# Patient Record
Sex: FEMALE | Race: ASIAN
[De-identification: names, ages, dates, MRNs, and addresses within clinical notes are randomized per-mention and may not be internally consistent; named-entity substitution may affect disease eponyms.]

---

## 2019-11-29 PROBLEM — Z00.00 ENCOUNTER FOR PREVENTIVE HEALTH EXAMINATION: Status: ACTIVE | Noted: 2019-11-29

## 2019-12-10 ENCOUNTER — APPOINTMENT (OUTPATIENT)
Dept: ORTHOPEDIC SURGERY | Facility: CLINIC | Age: 54
End: 2019-12-10
Payer: COMMERCIAL

## 2019-12-10 VITALS — BODY MASS INDEX: 29.02 KG/M2 | WEIGHT: 170 LBS | HEIGHT: 64 IN

## 2019-12-10 VITALS — HEART RATE: 87 BPM | SYSTOLIC BLOOD PRESSURE: 128 MMHG | DIASTOLIC BLOOD PRESSURE: 88 MMHG

## 2019-12-10 DIAGNOSIS — Z78.9 OTHER SPECIFIED HEALTH STATUS: ICD-10-CM

## 2019-12-10 DIAGNOSIS — M79.662 PAIN IN LEFT LOWER LEG: ICD-10-CM

## 2019-12-10 DIAGNOSIS — Z98.890 OTHER SPECIFIED POSTPROCEDURAL STATES: ICD-10-CM

## 2019-12-10 DIAGNOSIS — Z82.49 FAMILY HISTORY OF ISCHEMIC HEART DISEASE AND OTHER DISEASES OF THE CIRCULATORY SYSTEM: ICD-10-CM

## 2019-12-10 DIAGNOSIS — M48.061 SPINAL STENOSIS, LUMBAR REGION WITHOUT NEUROGENIC CLAUDICATION: ICD-10-CM

## 2019-12-10 DIAGNOSIS — Z86.79 PERSONAL HISTORY OF OTHER DISEASES OF THE CIRCULATORY SYSTEM: ICD-10-CM

## 2019-12-10 PROCEDURE — 73590 X-RAY EXAM OF LOWER LEG: CPT | Mod: LT

## 2019-12-10 PROCEDURE — 99204 OFFICE O/P NEW MOD 45 MIN: CPT

## 2019-12-10 NOTE — DISCUSSION/SUMMARY
[de-identified] : Referred pain to left calf likely from lumbar spinal foraminal stenosis\par \par Due to significant improvement in pain with patient over the last few days recommend conservative management including\par \par Physical therapy\par \par The patient was prescribed Diclofenac PO non-steroidal anti-inflammatory medication. 50mg tablets twice daily to be taken for at least 1-2 weeks in a row and then PRN afterwards. Risks and benefits were discussed and include but not limited to renal damage and GI ulceration and bleeding.  They were advised to take with food to limit stomach upset as well as warned to stop the medication if worsening gastric pain or dizziness or other side effects. Also to immediately stop the medication and seek appropriate medical attention if any severe stomach ache, gastritis, black/red vomit, black/red stools or any other medical concern.\par \par \par Followup p.r.n.

## 2019-12-10 NOTE — HISTORY OF PRESENT ILLNESS
[de-identified] : CC LEFT knee\par \par JANES LEBLANC, 54 year old F RHD presents with acute onset of 2 months  of Activity Related pain in the proximal LEFT calf without injury. The pain is better, and rated a 3# out of 10, described as Burning/Sharp, without radiation. Rest makes the pain better and walking/stairs makes the pain worse. The patient reports associated symptoms of weakness.  The patient denies pain at night affecting sleep, and denies similar pain previously.  Patient stated she had an ultrasound study done  of the left leg and denies DVTs/blood clots.\par \par The patient has tried the following treatments:\par Activity modification	+ \par Ice/Compression  	               + \par Braces    		               -\par Nsaids    		               +\par Physical Therapy  	              -\par Cortisone Injection	               -\par Visco Injection		-\par Arthroscopy		-\par \par Review of Systems is positive for the above musculoskeletal symptoms and is otherwise non-contributory for general, constitutional, psychiatric, neurologic, HEENT, cardiac, respiratory, gastrointestinal, reproductive, lymphatic, and dermatologic complaints.\par \par Consult by Dr Lee Amaya\par

## 2019-12-10 NOTE — PHYSICAL EXAM
[de-identified] : \par Physical Examination\par General: well nourished, in no acute distress, alert and oriented to person, place and time\par Psychiatric: normal mood and affect, no abnormal movements or speech patterns\par Eyes: vision intact With glasses\par Throat: no thyromegaly\par Lymph: no enlarged nodes, no lymphedema in extremity\par Respiratory: no wheezing, no shortness of breath with ambulation\par Cardiac: no cardiac leg swelling, 2+ peripheral pulses\par Neurology: normal gross sensation in extremities to light touch\par Abdomen: soft, non-tender, tympanic, no masses\par \par Musculoskeletal Examination\par Ambulation	 +  antalgic gait, +  assistive devices\par \par Knee			Right			Left\par General\par      Swelling/Deformity	normal			normal	\par      Skin			normal			normal\par      Erythema		-			-\par      Standing Alignment	neutral			neutral\par      Effusion		none			none\par Range of Motion\par      Hip			full painless ROM		full painless ROM\par      Knee Flexion		130			130\par      Knee Extension	0			0\par Patella\par      J Sign		-			-\par      Quad Medial/Lateral	1/1 1/1\par      Apprehension		-			-\par      Elliott's		-			-\par      Grind Sign		-			-\par      Crepitus		-			-\par Palpation\par      Medial Joint Line	-			-\par      Medial Fem Condyle	-			-\par      Lateral Joint Line	-			-\par      Quad Tendon		-			-\par      Patella Tendon	-			-\par      Medial Patella		-			-\par      Lateral Patella 	-			-\par      Posterior Knee	-			-\par no tenderness left gastroc\par Ligamentous\par      Varus @ 0° / 30°	-/-			-/-\par      Valgus @ 0° / 30°	-/-			-/-\par      Lachman		-			-\par      Pivot Shift		-			-\par      Anterior Drawer	-			-\par      Posterior Drawer	-			-\par Meniscus\par      Abdi		-			-\par      Flexion Pinch		-			-\par Single Leg Raise                     -                                               -\par Strength Examination/Atrophy\par      Hip Flexors 		5+			5+\par      Quadriceps		5+			5+\par      Hamstring		5+			5+\par      Tibialis Anterior	5+			5+\par      Achilles/Soleus	5+			5+\par Sensation\par      Deep Peroneal	normal			normal\par      Superficial Peroneal 	normal			normal\par      Sural  		normal			normal\par      Posterior Tibial 	normal			normal\par      Saphneous 		normal			normal\par Pulses\par      DP			2+			2+\par  [de-identified] : 2 views a left tib-fib for order taken and divided the muscle today and demonstrate\par No fracture dislocation, no soft tissue or bony lesions noted\par Normal soft tissue outlines

## 2022-07-06 ENCOUNTER — APPOINTMENT (OUTPATIENT)
Dept: RHEUMATOLOGY | Facility: CLINIC | Age: 57
End: 2022-07-06

## 2022-07-06 VITALS
BODY MASS INDEX: 28 KG/M2 | HEART RATE: 77 BPM | HEIGHT: 64 IN | WEIGHT: 164 LBS | DIASTOLIC BLOOD PRESSURE: 84 MMHG | SYSTOLIC BLOOD PRESSURE: 131 MMHG | OXYGEN SATURATION: 97 % | TEMPERATURE: 97.9 F

## 2022-07-06 DIAGNOSIS — J45.909 UNSPECIFIED ASTHMA, UNCOMPLICATED: ICD-10-CM

## 2022-07-06 DIAGNOSIS — I10 ESSENTIAL (PRIMARY) HYPERTENSION: ICD-10-CM

## 2022-07-06 PROCEDURE — 99204 OFFICE O/P NEW MOD 45 MIN: CPT

## 2022-07-06 RX ORDER — DICLOFENAC SODIUM 50 MG/1
50 TABLET, DELAYED RELEASE ORAL
Qty: 60 | Refills: 0 | Status: DISCONTINUED | COMMUNITY
Start: 2019-12-10 | End: 2022-07-06

## 2022-07-06 NOTE — CONSULT LETTER
[Dear  ___] : Dear  [unfilled], [Consult Letter:] : I had the pleasure of evaluating your patient, [unfilled]. [Consult Closing:] : Thank you very much for allowing me to participate in the care of this patient.  If you have any questions, please do not hesitate to contact me. [Sincerely,] : Sincerely, [FreeTextEntry2] : Dr. Scarlet Amaya \par [address] [facility], \[place] [address] [FreeTextEntry3] : Marco Nick MD

## 2022-07-06 NOTE — ASSESSMENT
[FreeTextEntry1] : 57 y/o F w R wrist pain\par =R wrist pain momentary w certain movement on CMC area\par =hx of trauma\par =positive Finkelstein\par \par Likely has De Quervain's tenosynovitis vs OA vs other soft tissue issue. Doubt pt has inflammatory or even degenerative issue like OA, given fleeting nature of pain. Will start w plain films. Might escalate imaging as necessary. \par \par Plan\par -Xray of R wrist \par RTO depending on results above

## 2022-07-06 NOTE — PHYSICAL EXAM
[Auscultation Breath Sounds / Voice Sounds] : lungs were clear to auscultation bilaterally [Heart Sounds] : normal S1 and S2 [Heart Sounds Gallop] : no gallops [Murmurs] : no murmurs [Heart Sounds Pericardial Friction Rub] : no pericardial rub [Abdomen Soft] : soft [Abdomen Tenderness] : non-tender [FreeTextEntry1] : positive Finkelstein on R wrist  [] : no rash [Skin Lesions] : no skin lesions [Oriented To Time, Place, And Person] : oriented to person, place, and time

## 2022-07-06 NOTE — HISTORY OF PRESENT ILLNESS
[FreeTextEntry1] : Referring physician: Lee Amaya\par \par 55 y/o F here for consultation. \par \par Pt says 3 months ago, pt baned L hand, around thenar area. Pt was ok, then pt developed pain in thenar aspect. Pt says she got a bony prominence. Pain is severe, and difficulty sleeping. Difficulty cooking (), or writing. Gets shooting pain with certain movements. \par No other joint pain or swelling. \par Pt has not tried anything for pain. \par \par Has hx of OA, but does not have severe pain. Pt says her PIPs, DIPs have increased bony bumps. \par \par No fevers, h/a, rashes, hair loss, oral ulcers, epistaxis, sinusitis,  swollen glands, dry mouth, dry eyes, CP, SOB, cough, vision changes, abdominal pain, GERD, n/v/d, blood in stool or urine, focal weakness, sensory loss,  Raynaud's, joint pain, swelling, weight loss.

## 2022-07-20 ENCOUNTER — NON-APPOINTMENT (OUTPATIENT)
Age: 57
End: 2022-07-20

## 2022-07-20 DIAGNOSIS — M79.641 PAIN IN RIGHT HAND: ICD-10-CM

## 2022-07-20 DIAGNOSIS — M79.642 PAIN IN RIGHT HAND: ICD-10-CM

## 2022-07-20 DIAGNOSIS — M25.531 PAIN IN RIGHT WRIST: ICD-10-CM

## 2022-07-20 RX ORDER — MELOXICAM 7.5 MG/1
7.5 TABLET ORAL
Qty: 90 | Refills: 1 | Status: ACTIVE | COMMUNITY
Start: 2022-07-20 | End: 1900-01-01